# Patient Record
Sex: MALE | Race: WHITE | Employment: FULL TIME | URBAN - METROPOLITAN AREA
[De-identification: names, ages, dates, MRNs, and addresses within clinical notes are randomized per-mention and may not be internally consistent; named-entity substitution may affect disease eponyms.]

---

## 2018-03-09 ENCOUNTER — OFFICE VISIT (OUTPATIENT)
Dept: INTERNAL MEDICINE CLINIC | Facility: CLINIC | Age: 63
End: 2018-03-09

## 2018-03-09 VITALS
BODY MASS INDEX: 27.72 KG/M2 | HEIGHT: 71 IN | WEIGHT: 198 LBS | DIASTOLIC BLOOD PRESSURE: 84 MMHG | HEART RATE: 83 BPM | SYSTOLIC BLOOD PRESSURE: 146 MMHG

## 2018-03-09 DIAGNOSIS — I10 ESSENTIAL HYPERTENSION: ICD-10-CM

## 2018-03-09 DIAGNOSIS — K40.90 RIGHT INGUINAL HERNIA: Primary | ICD-10-CM

## 2018-03-09 PROCEDURE — 99212 OFFICE O/P EST SF 10 MIN: CPT | Performed by: INTERNAL MEDICINE

## 2018-03-09 PROCEDURE — 99203 OFFICE O/P NEW LOW 30 MIN: CPT | Performed by: INTERNAL MEDICINE

## 2018-03-09 NOTE — PATIENT INSTRUCTIONS
Please schedule an appointment with Dr. Gerson Lala to evaluate your hernia. Schedule a physical with me soon.

## 2018-03-09 NOTE — PROGRESS NOTES
Javon Bailey is a 63year old male who presents this morning for evaluation. HPI:   For approximately 14 months, he has had an enlarging right groin hernia, with persistent swelling in that area. He wears a truss for support. No associated pain.   H swelling  NEURO: Occasional headache    EXAM:   GENERAL: Pleasant male appearing well in no distress  /84   Pulse 83   Ht 5' 11\" (1.803 m)   Wt 198 lb (89.8 kg)   BMI 27.62 kg/m²   HEENT: Anicteric, conjunctiva pink, oropharynx normal  NECK: Supple

## 2018-03-15 ENCOUNTER — OFFICE VISIT (OUTPATIENT)
Dept: SURGERY | Facility: CLINIC | Age: 63
End: 2018-03-15

## 2018-03-15 VITALS — WEIGHT: 198 LBS | HEIGHT: 71 IN | BODY MASS INDEX: 27.72 KG/M2

## 2018-03-15 DIAGNOSIS — K40.31 RECURRENT INGUINAL HERNIA OF RIGHT SIDE WITH OBSTRUCTION: Primary | ICD-10-CM

## 2018-03-15 DIAGNOSIS — K40.90 LEFT INGUINAL HERNIA: ICD-10-CM

## 2018-03-15 PROCEDURE — 99212 OFFICE O/P EST SF 10 MIN: CPT | Performed by: SURGERY

## 2018-03-15 PROCEDURE — 99244 OFF/OP CNSLTJ NEW/EST MOD 40: CPT | Performed by: SURGERY

## 2018-03-15 NOTE — H&P
History and Physical      Sherrill Marroquin is a 63year old male. HPI   Patient presents with:  Hernia: Patient referred by PCP Dr. Kvng Shetty for right inguinal hernia. Patient first noticed about 14 months ago.  States had RIH repair performed about 22 y the the HPI. PHYSICAL EXAM   Body mass index is 27.62 kg/m². No LMP for male patient.   Constitutional: appears well hydrated alert and responsive no acute distress noted  Head/Face: normocephalic  Nose/Mouth/Throat: nose and throat are clear palate

## 2018-04-27 ENCOUNTER — TELEPHONE (OUTPATIENT)
Dept: SURGERY | Facility: CLINIC | Age: 63
End: 2018-04-27

## 2018-04-27 ENCOUNTER — HOSPITAL ENCOUNTER (OUTPATIENT)
Dept: CT IMAGING | Age: 63
Discharge: HOME | End: 2018-04-27
Attending: SURGERY
Payer: COMMERCIAL

## 2018-04-27 DIAGNOSIS — K40.90 LEFT INGUINAL HERNIA: ICD-10-CM

## 2018-04-27 DIAGNOSIS — K40.31 RECURRENT INGUINAL HERNIA OF RIGHT SIDE WITH OBSTRUCTION: ICD-10-CM

## 2018-04-27 PROCEDURE — 74177 CT ABD & PELVIS W/CONTRAST: CPT | Performed by: SURGERY

## 2018-04-27 PROCEDURE — 82565 ASSAY OF CREATININE: CPT

## 2018-04-27 NOTE — TELEPHONE ENCOUNTER
Notes recorded by Balwinder Mondragon MD on 4/27/2018 at 1:57 PM CDT  Notified by radiology of evidence for recurrent hernia and bowel obstruction on outpt CT scan - reviewed.  Called pt, he has been vomiting intermittently since Tues, occ abd pain and swelling

## 2018-04-27 NOTE — TELEPHONE ENCOUNTER
Left detailed message on patient's personal VM repeating the message Dr. Stacy Gottron relayed earlier today.

## 2018-04-30 ENCOUNTER — TELEPHONE (OUTPATIENT)
Dept: SURGERY | Facility: CLINIC | Age: 63
End: 2018-04-30

## 2018-04-30 NOTE — TELEPHONE ENCOUNTER
1146: Received fax confirmation receipt, status \"success\" from [phone]. Documents sent to scan.

## 2018-04-30 NOTE — TELEPHONE ENCOUNTER
Pt requesting to speak to RN re: work note, states he will discuss further with RN. Pls call thank you.

## 2018-04-30 NOTE — TELEPHONE ENCOUNTER
Call transferred from [address] [place] [address]. Patient requesting letter stating he is having surgery on 05/02 to be sent to his employer, attn: Jonah Erwin via fax [phone]. Patient given contact info to [facility], phone [phone] and fax [phone].  Letter written, faxed as r

## 2018-04-30 NOTE — TELEPHONE ENCOUNTER
Contacted patient. Offered DOS 05/02/2018 at [address] [place] [address]. Patient accepted. Patient will receive call on 05/01 with time and details. Pt verified home phone #. NPO after MN. Avoid NSAIDs/ASA. Tylenol/tylenol ES is ok.  Patient is to schedule post op appt for 10-14

## 2018-05-02 ENCOUNTER — ANESTHESIA EVENT (OUTPATIENT)
Dept: SURGERY | Facility: HOSPITAL | Age: 63
End: 2018-05-02
Payer: COMMERCIAL

## 2018-05-02 ENCOUNTER — ANESTHESIA (OUTPATIENT)
Dept: SURGERY | Facility: HOSPITAL | Age: 63
End: 2018-05-02
Payer: COMMERCIAL

## 2018-05-02 ENCOUNTER — HOSPITAL ENCOUNTER (OUTPATIENT)
Facility: HOSPITAL | Age: 63
Setting detail: HOSPITAL OUTPATIENT SURGERY
Discharge: HOME HEALTH | End: 2018-05-02
Attending: SURGERY | Admitting: SURGERY
Payer: COMMERCIAL

## 2018-05-02 ENCOUNTER — SURGERY (OUTPATIENT)
Age: 63
End: 2018-05-02

## 2018-05-02 VITALS
OXYGEN SATURATION: 97 % | TEMPERATURE: 99 F | RESPIRATION RATE: 16 BRPM | HEIGHT: 71 IN | WEIGHT: 192 LBS | DIASTOLIC BLOOD PRESSURE: 87 MMHG | SYSTOLIC BLOOD PRESSURE: 134 MMHG | BODY MASS INDEX: 26.88 KG/M2 | HEART RATE: 66 BPM

## 2018-05-02 DIAGNOSIS — K40.31 RECURRENT INGUINAL HERNIA OF RIGHT SIDE WITH OBSTRUCTION AND WITHOUT GANGRENE: Primary | ICD-10-CM

## 2018-05-02 PROCEDURE — 0YU50JZ SUPPLEMENT RIGHT INGUINAL REGION WITH SYNTHETIC SUBSTITUTE, OPEN APPROACH: ICD-10-PCS | Performed by: SURGERY

## 2018-05-02 PROCEDURE — 49521 REREPAIR ING HERNIA BLOCKED: CPT | Performed by: SURGERY

## 2018-05-02 DEVICE — POLYPROPLYLENE NONABSORBABLE SYNTHETIC SURGICAL MESH
Type: IMPLANTABLE DEVICE | Site: INGUINAL | Status: FUNCTIONAL
Brand: PROLENE

## 2018-05-02 RX ORDER — ROCURONIUM BROMIDE 10 MG/ML
INJECTION, SOLUTION INTRAVENOUS AS NEEDED
Status: DISCONTINUED | OUTPATIENT
Start: 2018-05-02 | End: 2018-05-02 | Stop reason: SURG

## 2018-05-02 RX ORDER — HALOPERIDOL 5 MG/ML
0.25 INJECTION INTRAMUSCULAR ONCE AS NEEDED
Status: DISCONTINUED | OUTPATIENT
Start: 2018-05-02 | End: 2018-05-02

## 2018-05-02 RX ORDER — HYDROCODONE BITARTRATE AND ACETAMINOPHEN 5; 325 MG/1; MG/1
1 TABLET ORAL AS NEEDED
Status: DISCONTINUED | OUTPATIENT
Start: 2018-05-02 | End: 2018-05-02

## 2018-05-02 RX ORDER — METOCLOPRAMIDE 10 MG/1
10 TABLET ORAL ONCE
Status: DISCONTINUED | OUTPATIENT
Start: 2018-05-02 | End: 2018-05-02 | Stop reason: HOSPADM

## 2018-05-02 RX ORDER — HYDROCODONE BITARTRATE AND ACETAMINOPHEN 5; 325 MG/1; MG/1
2 TABLET ORAL AS NEEDED
Status: DISCONTINUED | OUTPATIENT
Start: 2018-05-02 | End: 2018-05-02

## 2018-05-02 RX ORDER — BUPIVACAINE HYDROCHLORIDE AND EPINEPHRINE 5; 5 MG/ML; UG/ML
INJECTION, SOLUTION PERINEURAL AS NEEDED
Status: DISCONTINUED | OUTPATIENT
Start: 2018-05-02 | End: 2018-05-02 | Stop reason: HOSPADM

## 2018-05-02 RX ORDER — HYDROMORPHONE HYDROCHLORIDE 1 MG/ML
0.4 INJECTION, SOLUTION INTRAMUSCULAR; INTRAVENOUS; SUBCUTANEOUS EVERY 5 MIN PRN
Status: DISCONTINUED | OUTPATIENT
Start: 2018-05-02 | End: 2018-05-02

## 2018-05-02 RX ORDER — NALOXONE HYDROCHLORIDE 0.4 MG/ML
80 INJECTION, SOLUTION INTRAMUSCULAR; INTRAVENOUS; SUBCUTANEOUS AS NEEDED
Status: DISCONTINUED | OUTPATIENT
Start: 2018-05-02 | End: 2018-05-02

## 2018-05-02 RX ORDER — ONDANSETRON 2 MG/ML
4 INJECTION INTRAMUSCULAR; INTRAVENOUS ONCE AS NEEDED
Status: DISCONTINUED | OUTPATIENT
Start: 2018-05-02 | End: 2018-05-02

## 2018-05-02 RX ORDER — ONDANSETRON 2 MG/ML
INJECTION INTRAMUSCULAR; INTRAVENOUS AS NEEDED
Status: DISCONTINUED | OUTPATIENT
Start: 2018-05-02 | End: 2018-05-02 | Stop reason: SURG

## 2018-05-02 RX ORDER — FAMOTIDINE 20 MG/1
20 TABLET ORAL ONCE
Status: DISCONTINUED | OUTPATIENT
Start: 2018-05-02 | End: 2018-05-02 | Stop reason: HOSPADM

## 2018-05-02 RX ORDER — SODIUM CHLORIDE, SODIUM LACTATE, POTASSIUM CHLORIDE, CALCIUM CHLORIDE 600; 310; 30; 20 MG/100ML; MG/100ML; MG/100ML; MG/100ML
INJECTION, SOLUTION INTRAVENOUS CONTINUOUS
Status: DISCONTINUED | OUTPATIENT
Start: 2018-05-02 | End: 2018-05-02

## 2018-05-02 RX ORDER — HYDROMORPHONE HYDROCHLORIDE 1 MG/ML
0.2 INJECTION, SOLUTION INTRAMUSCULAR; INTRAVENOUS; SUBCUTANEOUS EVERY 5 MIN PRN
Status: DISCONTINUED | OUTPATIENT
Start: 2018-05-02 | End: 2018-05-02

## 2018-05-02 RX ORDER — LIDOCAINE HYDROCHLORIDE 10 MG/ML
INJECTION, SOLUTION EPIDURAL; INFILTRATION; INTRACAUDAL; PERINEURAL AS NEEDED
Status: DISCONTINUED | OUTPATIENT
Start: 2018-05-02 | End: 2018-05-02 | Stop reason: SURG

## 2018-05-02 RX ORDER — DEXAMETHASONE SODIUM PHOSPHATE 4 MG/ML
VIAL (ML) INJECTION AS NEEDED
Status: DISCONTINUED | OUTPATIENT
Start: 2018-05-02 | End: 2018-05-02 | Stop reason: SURG

## 2018-05-02 RX ORDER — GLYCOPYRROLATE 0.2 MG/ML
INJECTION INTRAMUSCULAR; INTRAVENOUS AS NEEDED
Status: DISCONTINUED | OUTPATIENT
Start: 2018-05-02 | End: 2018-05-02 | Stop reason: SURG

## 2018-05-02 RX ORDER — MIDAZOLAM HYDROCHLORIDE 1 MG/ML
INJECTION INTRAMUSCULAR; INTRAVENOUS AS NEEDED
Status: DISCONTINUED | OUTPATIENT
Start: 2018-05-02 | End: 2018-05-02 | Stop reason: SURG

## 2018-05-02 RX ORDER — CEFAZOLIN SODIUM/WATER 2 G/20 ML
2 SYRINGE (ML) INTRAVENOUS ONCE
Status: COMPLETED | OUTPATIENT
Start: 2018-05-02 | End: 2018-05-02

## 2018-05-02 RX ORDER — DOCUSATE SODIUM 100 MG/1
100 CAPSULE, LIQUID FILLED ORAL 2 TIMES DAILY
Qty: 50 CAPSULE | Refills: 0 | Status: SHIPPED | OUTPATIENT
Start: 2018-05-02

## 2018-05-02 RX ORDER — HYDROCODONE BITARTRATE AND ACETAMINOPHEN 5; 325 MG/1; MG/1
1 TABLET ORAL EVERY 4 HOURS PRN
Qty: 20 TABLET | Refills: 0 | Status: SHIPPED | OUTPATIENT
Start: 2018-05-02

## 2018-05-02 RX ORDER — NEOSTIGMINE METHYLSULFATE 0.5 MG/ML
INJECTION INTRAVENOUS AS NEEDED
Status: DISCONTINUED | OUTPATIENT
Start: 2018-05-02 | End: 2018-05-02 | Stop reason: SURG

## 2018-05-02 RX ORDER — ACETAMINOPHEN 500 MG
1000 TABLET ORAL ONCE
Status: COMPLETED | OUTPATIENT
Start: 2018-05-02 | End: 2018-05-02

## 2018-05-02 RX ORDER — HYDROMORPHONE HYDROCHLORIDE 1 MG/ML
0.6 INJECTION, SOLUTION INTRAMUSCULAR; INTRAVENOUS; SUBCUTANEOUS EVERY 5 MIN PRN
Status: DISCONTINUED | OUTPATIENT
Start: 2018-05-02 | End: 2018-05-02

## 2018-05-02 RX ADMIN — ROCURONIUM BROMIDE 10 MG: 10 INJECTION, SOLUTION INTRAVENOUS at 12:12:00

## 2018-05-02 RX ADMIN — NEOSTIGMINE METHYLSULFATE 3 MG: 0.5 INJECTION INTRAVENOUS at 13:28:00

## 2018-05-02 RX ADMIN — LIDOCAINE HYDROCHLORIDE 50 MG: 10 INJECTION, SOLUTION EPIDURAL; INFILTRATION; INTRACAUDAL; PERINEURAL at 11:15:00

## 2018-05-02 RX ADMIN — DEXAMETHASONE SODIUM PHOSPHATE 4 MG: 4 MG/ML VIAL (ML) INJECTION at 11:25:00

## 2018-05-02 RX ADMIN — MIDAZOLAM HYDROCHLORIDE 2 MG: 1 INJECTION INTRAMUSCULAR; INTRAVENOUS at 11:14:00

## 2018-05-02 RX ADMIN — SODIUM CHLORIDE, SODIUM LACTATE, POTASSIUM CHLORIDE, CALCIUM CHLORIDE: 600; 310; 30; 20 INJECTION, SOLUTION INTRAVENOUS at 11:13:00

## 2018-05-02 RX ADMIN — ROCURONIUM BROMIDE 10 MG: 10 INJECTION, SOLUTION INTRAVENOUS at 12:21:00

## 2018-05-02 RX ADMIN — ROCURONIUM BROMIDE 30 MG: 10 INJECTION, SOLUTION INTRAVENOUS at 11:15:00

## 2018-05-02 RX ADMIN — SODIUM CHLORIDE, SODIUM LACTATE, POTASSIUM CHLORIDE, CALCIUM CHLORIDE: 600; 310; 30; 20 INJECTION, SOLUTION INTRAVENOUS at 13:48:00

## 2018-05-02 RX ADMIN — ONDANSETRON 4 MG: 2 INJECTION INTRAMUSCULAR; INTRAVENOUS at 13:27:00

## 2018-05-02 RX ADMIN — GLYCOPYRROLATE 0.6 MG: 0.2 INJECTION INTRAMUSCULAR; INTRAVENOUS at 13:28:00

## 2018-05-02 RX ADMIN — CEFAZOLIN SODIUM/WATER 2 G: 2 G/20 ML SYRINGE (ML) INTRAVENOUS at 11:25:00

## 2018-05-02 NOTE — INTERVAL H&P NOTE
Pre-op Diagnosis: Right inguinal hernia, recurrent     The above referenced H&P was reviewed by Bull Healy MD on 5/2/2018, the patient was examined and no significant changes have occurred in the patient's condition since the H&P was performed.   I discu

## 2018-05-02 NOTE — ANESTHESIA PREPROCEDURE EVALUATION
Anesthesia PreOp Note    HPI:     Shabbir Lara is a 63year old male who presents for preoperative consultation requested by: Kj Mcclure MD    Date of Surgery: 5/2/2018    Procedure(s):   HERNIA INGUINAL REPAIR ADULT  Indication: Right inguinal her °C). His blood pressure is 147/87 and his pulse is 82. His respiration is 16 and oxygen saturation is 97%.     04/30/18  1807 05/02/18  0955   BP:  147/87   BP Location:  Right arm   Pulse:  82   Resp:  16   Temp:  98.9 °F (37.2 °C)   TempSrc:  Oral   SpO2:

## 2018-05-02 NOTE — OPERATIVE REPORT
[facility]    PATIENT'S NAME: Ric Steward   ATTENDING PHYSICIAN: Tressa Hale MD   OPERATING PHYSICIAN: Tressa Hale MD   PATIENT ACCOUNT#:   [de-identified]    LOCATION:  [facility] PACU [address] [place] [address]  MEDICAL RECORD #:   [id number]       CELY angulated scar in the right groin. I designed an elliptical skin incision to include the hypertrophic scar and a little more length medially and laterally.   Skin incision was made and extended sharply through the deep subcutaneous tissue using electrocaut the preperitoneal space. The onlay mesh was then secured across the entire inguinal floor using interrupted 0 Vicryl suture. Good coverage of the defect was achieved and adequate hemostasis noted. The wound was irrigated once again.   It was closed in la

## 2018-05-02 NOTE — H&P
History and Physical        William Martins is a 63year old male.        HPI   Patient presents with:  Hernia: Patient referred by PCP Dr. Adela Mar for right inguinal hernia. Patient first noticed about 14 months ago.  States had RIH repair performed about Pertinent positives and negatives noted in the the HPI.           PHYSICAL EXAM   Body mass index is 27.62 kg/m². No LMP for male patient.   Constitutional: appears well hydrated alert and responsive no acute distress noted  Head/Face: normocephalic  Nose/

## 2018-05-02 NOTE — ANESTHESIA POSTPROCEDURE EVALUATION
Patient: Susu Roger    Procedure Summary     Date:  05/02/18 Room / Location:  [facility] OR 04 / [facility] OR    Anesthesia Start:  1869 Anesthesia Stop:      Procedure:   HERNIA INGUINAL REPAIR ADULT (Right ) Diagnosis:  (Right inguinal hernia, recurr

## 2018-05-02 NOTE — BRIEF OP NOTE
Pre-Operative Diagnosis: Right inguinal hernia, recurrent      Post-Operative Diagnosis: Right inguinal hernia, recurrent       Procedure Performed:   Procedure(s):  Recurrent incarcerated right inguinal hernia repair with mesh     Surgeon(s) and Role:

## 2018-05-09 ENCOUNTER — TELEPHONE (OUTPATIENT)
Dept: SURGERY | Facility: CLINIC | Age: 63
End: 2018-05-09

## 2018-05-09 NOTE — TELEPHONE ENCOUNTER
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 Logged for processing.  NK

## 2018-05-09 NOTE — TELEPHONE ENCOUNTER
Dr. Everardo Sharma,     Consecutive leave s/p surgery recovery. RTW date to be determined at next post op eval.  DRW       Please sign off on form:  -Highlight the patient and hit \"Chart\" button.   -In Chart Review, w/in the Encounter tab - click 1 time on the T

## 2018-05-10 NOTE — TELEPHONE ENCOUNTER
Dr. Estefanía Lal,    Please sign off on form:  -Highlight the patient and hit \"Chart\" button. -In Chart Review, w/in the Encounter tab - click 1 time on the [place] call encounter for_05/09/18.  Scroll down the telephone encounter.  -Click \"scan on\" blue H

## 2018-05-15 ENCOUNTER — OFFICE VISIT (OUTPATIENT)
Dept: SURGERY | Facility: CLINIC | Age: 63
End: 2018-05-15

## 2018-05-15 VITALS — BODY MASS INDEX: 27 KG/M2 | WEIGHT: 192 LBS

## 2018-05-15 DIAGNOSIS — K40.31 RECURRENT UNILATERAL INGUINAL HERNIA WITH INCARCERATION: Primary | ICD-10-CM

## 2018-05-15 PROCEDURE — 99024 POSTOP FOLLOW-UP VISIT: CPT | Performed by: SURGERY

## 2018-05-15 PROCEDURE — 99212 OFFICE O/P EST SF 10 MIN: CPT | Performed by: SURGERY

## 2018-05-15 NOTE — TELEPHONE ENCOUNTER
Form faxed and mailed to [facility] 05/11, recs update faxed 05/09, scanned X2, copies mailed to guilherme GRECO

## 2018-05-15 NOTE — PROGRESS NOTES
Postoperative Patient Follow-up      5/15/2018    Sabina Pila Palesch 63year old      HPI  Patient presents with:  Post-Op: S/P Repair of incarcerated right inguinal hernia with mesh on 5/2/2018.   Patient has no dressings, denies fevers, bowels are regular,

## 2018-05-16 NOTE — TELEPHONE ENCOUNTER
Pt called - needs the RTW form faxed to [employer]- Attn: Fabiola fax [phone]. Logged for processing. No addl fee.  NK

## 2018-05-23 NOTE — TELEPHONE ENCOUNTER
Dr. Yun Ortega,    Please sign off on form:  -Highlight the patient and hit \"Chart\" button. -In Chart Review, w/in the Encounter tab - click 1 time on the [place]phone call encounter for 05/09/18.  Scroll down the telephone encounter.  -Click \"scan on\" blue H

## 2018-05-29 NOTE — TELEPHONE ENCOUNTER
Dr. Kayleigh Michaels,    Please sign off on form:  -Highlight the patient and hit \"Chart\" button. -In Chart Review, w/in the Encounter tab - click 1 time on the Telephone call encounter for 05/09/18.  Scroll down the telephone encounter.  -Click \"scan on\" blue H

## 2018-06-12 ENCOUNTER — TELEPHONE (OUTPATIENT)
Dept: SURGERY | Facility: CLINIC | Age: 63
End: 2018-06-12

## 2018-06-12 NOTE — TELEPHONE ENCOUNTER
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 Please fax letter attn to Margo Nielsen 46: [phone].

## 2018-06-14 ENCOUNTER — TELEPHONE (OUTPATIENT)
Dept: SURGERY | Facility: CLINIC | Age: 63
End: 2018-06-14

## 2018-06-14 NOTE — TELEPHONE ENCOUNTER
\"No precert required\" per Keven at [facility] for procedure on 05/02/2018, reference # [place] 04/30/2018.

## 2018-06-14 NOTE — TELEPHONE ENCOUNTER
Contacted patient. Informed him note can be written with dates 05/02/2018-06/12/2018, return to work 06/13/2018. Confirmed fax # [phone] attn: Sasha Richardson. Patient verbalized understanding and all questions answered. Letter written and faxed as requested. 4504: Received fax confirmation receipt status \"completed\" from [phone]. Documents sent to scan.

## 2018-07-05 ENCOUNTER — TELEPHONE (OUTPATIENT)
Dept: ADMINISTRATIVE | Age: 63
End: 2018-07-05

## 2018-07-05 NOTE — TELEPHONE ENCOUNTER
Dropped off by pt @[facility]. Form + [employer] Signed release + paid $15 for update. Logged for processing.  NK

## 2018-07-10 NOTE — TELEPHONE ENCOUNTER
Dr. Mariangel Nuno,    Please sign off on form:  -Highlight the patient and hit \"Chart\" button. -In Chart Review, w/in the Encounter tab - click 1 time on the [place]phone call encounter for 07/05/18.  Scroll down the telephone encounter.  -Click \"scan on\" blue H

## 2021-07-02 NOTE — TELEPHONE ENCOUNTER
Impression:  Presence of intraocular lens  Z96.1. Plan: patient complains of blurry vision, will plan PRK OU, with OD better for distance and OS better for near vision. Procedure and instructions were explained as well slow healing.  Will plan to do in August as patient is going on vacation Patient was returning call, tried to transfer. Clinic was closed for the day. Informed patient will be back in on Monday. Per patient is feeling a little better and will await call on Monday.

## 2022-11-01 NOTE — LETTER
4/30/2018          To Whom It May Concern:    Carolyn Steen is currently under my medical care and is scheduled for a surgical procedure on 05/02/2018. Further information regarding his work status to follow.     If you require additional information pl
Home

## (undated) DEVICE — VIOLET BRAIDED (POLYGLACTIN 910), SYNTHETIC ABSORBABLE SUTURE: Brand: COATED VICRYL

## (undated) DEVICE — NEEDLE 18G 1-1/2 BLUNT FILL

## (undated) DEVICE — DERMABOND LIQUID ADHESIVE

## (undated) DEVICE — SOL  .9 1000ML BTL

## (undated) DEVICE — CAUTERY BLADE 2IN INS E1455

## (undated) DEVICE — DRAPE SHEET TRANSVERSE LAP

## (undated) DEVICE — MINOR GENERAL: Brand: MEDLINE INDUSTRIES, INC.

## (undated) DEVICE — X-RAY DETECTABLE SPONGES,16 PLY: Brand: VISTEC

## (undated) DEVICE — SPONGE: SPECIALTY PEANUT XR 100/CS: Brand: MEDICAL ACTION INDUSTRIES

## (undated) DEVICE — SUTURE VICRYL 0 J340H

## (undated) DEVICE — DRAIN INCS 3/8IN 12IN PNRS RBR

## (undated) DEVICE — CLIPPER BLADE 3M

## (undated) DEVICE — SYRINGE MNJCT 10ML LF STRL REG

## (undated) DEVICE — STERILE LATEX POWDER-FREE SURGICAL GLOVESWITH NITRILE COATING: Brand: PROTEXIS

## (undated) DEVICE — UNDYED BRAIDED (POLYGLACTIN 910), SYNTHETIC ABSORBABLE SUTURE: Brand: COATED VICRYL

## (undated) DEVICE — TRAY SRGPRP PVP IOD WT SCRB SM

## (undated) NOTE — LETTER
6/14/2018          To Whom It May Concern:    Yoana Burnette is currently under my medical care. He underwent a surgical procedure on 05/02/2018. Please excuse work missed from 05/02/2018 through 06/12/2018. He may return to work on 06/13/2018 full duty, with no activity restrictions. If you require additional information please contact our office.         Sincerely,              Steph Self MD          Document generated by:  Glenny Morales

## (undated) NOTE — LETTER
No referring provider defined for this encounter. 03/15/18        Patient: Elvia Le   YOB: 1955   Date of Visit: 3/15/2018       Dear  Dr. Bridgett Winters MD,      Thank you for referring Elvia Le to my practice.   Please